# Patient Record
Sex: MALE
[De-identification: names, ages, dates, MRNs, and addresses within clinical notes are randomized per-mention and may not be internally consistent; named-entity substitution may affect disease eponyms.]

---

## 2018-06-30 ENCOUNTER — HOSPITAL ENCOUNTER (EMERGENCY)
Dept: HOSPITAL 88 - ER | Age: 38
Discharge: HOME | End: 2018-06-30
Payer: SELF-PAY

## 2018-06-30 VITALS — BODY MASS INDEX: 26.41 KG/M2 | WEIGHT: 195 LBS | HEIGHT: 72 IN

## 2018-06-30 DIAGNOSIS — N20.1: ICD-10-CM

## 2018-06-30 DIAGNOSIS — R10.9: Primary | ICD-10-CM

## 2018-06-30 DIAGNOSIS — R11.2: ICD-10-CM

## 2018-06-30 LAB
ALBUMIN SERPL-MCNC: 4.5 G/DL (ref 3.5–5)
ALBUMIN/GLOB SERPL: 1.5 {RATIO} (ref 0.8–2)
ALP SERPL-CCNC: 93 IU/L (ref 40–150)
ALT SERPL-CCNC: 24 IU/L (ref 0–55)
ANION GAP SERPL CALC-SCNC: 14.3 MMOL/L (ref 8–16)
BACTERIA URNS QL MICRO: (no result) /HPF
BASOPHILS # BLD AUTO: 0 10*3/UL (ref 0–0.1)
BASOPHILS NFR BLD AUTO: 0.2 % (ref 0–1)
BILIRUB UR QL: NEGATIVE
BUN SERPL-MCNC: 17 MG/DL (ref 7–26)
BUN/CREAT SERPL: 15 (ref 6–25)
CALCIUM SERPL-MCNC: 10.1 MG/DL (ref 8.4–10.2)
CHLORIDE SERPL-SCNC: 106 MMOL/L (ref 98–107)
CLARITY UR: CLEAR
CO2 SERPL-SCNC: 25 MMOL/L (ref 22–29)
COLOR UR: YELLOW
DEPRECATED NEUTROPHILS # BLD AUTO: 9 10*3/UL (ref 2.1–6.9)
DEPRECATED RBC URNS MANUAL-ACNC: (no result) /HPF (ref 0–5)
EGFRCR SERPLBLD CKD-EPI 2021: > 60 ML/MIN (ref 60–?)
EOSINOPHIL # BLD AUTO: 0 10*3/UL (ref 0–0.4)
EOSINOPHIL NFR BLD AUTO: 0.2 % (ref 0–6)
EPI CELLS URNS QL MICRO: (no result) /LPF
ERYTHROCYTE [DISTWIDTH] IN CORD BLOOD: 11.8 % (ref 11.7–14.4)
GLOBULIN PLAS-MCNC: 3 G/DL (ref 2.3–3.5)
GLUCOSE SERPLBLD-MCNC: 144 MG/DL (ref 74–118)
HCT VFR BLD AUTO: 46.1 % (ref 38.2–49.6)
HGB BLD-MCNC: 16.1 G/DL (ref 14–18)
KETONES UR QL STRIP.AUTO: (no result)
LEUKOCYTE ESTERASE UR QL STRIP.AUTO: NEGATIVE
LIPASE SERPL-CCNC: 18 U/L (ref 8–78)
LYMPHOCYTES # BLD: 1 10*3/UL (ref 1–3.2)
LYMPHOCYTES NFR BLD AUTO: 9.2 % (ref 18–39.1)
MCH RBC QN AUTO: 30.4 PG (ref 28–32)
MCHC RBC AUTO-ENTMCNC: 34.9 G/DL (ref 31–35)
MCV RBC AUTO: 87 FL (ref 81–99)
MONOCYTES # BLD AUTO: 0.4 10*3/UL (ref 0.2–0.8)
MONOCYTES NFR BLD AUTO: 3.7 % (ref 4.4–11.3)
MUCOUS THREADS URNS QL MICRO: (no result)
NEUTS SEG NFR BLD AUTO: 86.3 % (ref 38.7–80)
NITRITE UR QL STRIP.AUTO: NEGATIVE
PLATELET # BLD AUTO: 229 X10E3/UL (ref 140–360)
POTASSIUM SERPL-SCNC: 4.3 MMOL/L (ref 3.5–5.1)
PROT UR QL STRIP.AUTO: NEGATIVE
RBC # BLD AUTO: 5.3 X10E6/UL (ref 4.3–5.7)
SODIUM SERPL-SCNC: 141 MMOL/L (ref 136–145)
SP GR UR STRIP: 1.02 (ref 1.01–1.02)
UROBILINOGEN UR STRIP-MCNC: 0.2 MG/DL (ref 0.2–1)
WBC #/AREA URNS HPF: (no result) /HPF (ref 0–5)

## 2018-06-30 PROCEDURE — 74176 CT ABD & PELVIS W/O CONTRAST: CPT

## 2018-06-30 PROCEDURE — 99284 EMERGENCY DEPT VISIT MOD MDM: CPT

## 2018-06-30 PROCEDURE — 83690 ASSAY OF LIPASE: CPT

## 2018-06-30 PROCEDURE — 81001 URINALYSIS AUTO W/SCOPE: CPT

## 2018-06-30 PROCEDURE — 80053 COMPREHEN METABOLIC PANEL: CPT

## 2018-06-30 PROCEDURE — 85025 COMPLETE CBC W/AUTO DIFF WBC: CPT

## 2018-06-30 PROCEDURE — 36415 COLL VENOUS BLD VENIPUNCTURE: CPT

## 2018-06-30 NOTE — DIAGNOSTIC IMAGING REPORT
EXAMINATION: CT of the abdomen and pelvis without contrast.

 

TECHNIQUE: Helical CT images of the abdomen and pelvis were performed from the

lung bases to the lesser trochanters.  No intravenous contrast was given per

renal stone protocol.  Coronal and sagittal reformatted images were obtained.



COMPARISON:  None.



CLINICAL HISTORY:Flank pain

     

DISCUSSION: ABSENCE OF INTRAVENOUS CONTRAST DECREASES SENSITIVITY FOR DETECTION

OF FOCAL LESIONS AND VASCULAR PATHOLOGY.



ABDOMEN/PELVIS:



LOWER THORAX: Unremarkable. 



HEPATOBILIARY:No focal hepatic lesions.  No biliary ductal dilation.  The

gallbladder is normal.



SPLEEN: No splenomegaly.



PANCREAS: No focal masses or ductal dilatation.



ADRENALS: No adrenal nodules.



KIDNEYS/URETERS: Right kidney 5 mm nonobstructing calculus. Left distal

ureteral calculus 3 mm at the ureterovesicular junction causing minimal

hydronephrosis.



PELVIC ORGANS/BLADDER: The bladder is normal.  



PERITONEUM/RETROPERITONEUM: No free air or fluid.



LYMPH NODES: No intra-abdominal,retroperitoneal, pelvic or inguinal

lymphadenopathy.



VESSELS: Limited evaluation



GI TRACT: No distention or wall thickening.



BONES AND SOFT TISSUES: No bony destructive lesions.  No soft tissue

abnormalities.  



IMPRESSION: 



Left distal ureter 3 mm calculus causing minimal hydronephrosis.



Right renal 5 mm calculus without obstruction.



Signed by: Dr. Andrew Palisch, M.D. on 6/30/2018 6:15 PM